# Patient Record
Sex: FEMALE | Race: WHITE | URBAN - METROPOLITAN AREA
[De-identification: names, ages, dates, MRNs, and addresses within clinical notes are randomized per-mention and may not be internally consistent; named-entity substitution may affect disease eponyms.]

---

## 2018-06-01 ENCOUNTER — DOCTOR'S OFFICE (OUTPATIENT)
Dept: URBAN - METROPOLITAN AREA CLINIC 136 | Facility: CLINIC | Age: 38
Setting detail: OPHTHALMOLOGY
End: 2018-06-01
Payer: COMMERCIAL

## 2018-06-01 DIAGNOSIS — H16.222: ICD-10-CM

## 2018-06-01 DIAGNOSIS — H16.221: ICD-10-CM

## 2018-06-01 DIAGNOSIS — H02.89: ICD-10-CM

## 2018-06-01 PROBLEM — H10.11 ALLERGIC CONJUNCTIVITS; RIGHT EYE, LEFT EYE: Status: ACTIVE | Noted: 2018-06-01

## 2018-06-01 PROBLEM — H10.12 ALLERGIC CONJUNCTIVITS; RIGHT EYE, LEFT EYE: Status: ACTIVE | Noted: 2018-06-01

## 2018-06-01 PROCEDURE — 92004 COMPRE OPH EXAM NEW PT 1/>: CPT | Performed by: OPHTHALMOLOGY

## 2018-06-01 ASSESSMENT — REFRACTION_CURRENTRX
OD_OVR_VA: 20/
OS_OVR_VA: 20/
OS_OVR_VA: 20/
OD_OVR_VA: 20/
OS_OVR_VA: 20/
OD_OVR_VA: 20/

## 2018-06-01 ASSESSMENT — TEAR BREAK UP TIME (TBUT)
OD_TBUT: 1+
OS_TBUT: 1+

## 2018-06-01 ASSESSMENT — REFRACTION_AUTOREFRACTION
OD_CYLINDER: -1.00
OD_AXIS: 171
OD_SPHERE: +0.50
OS_AXIS: 152
OS_SPHERE: PLANO
OS_CYLINDER: -0.25

## 2018-06-01 ASSESSMENT — CONFRONTATIONAL VISUAL FIELD TEST (CVF)
OS_FINDINGS: FULL
OD_FINDINGS: FULL

## 2018-06-01 ASSESSMENT — LID EXAM ASSESSMENTS
OS_BLEPHARITIS: ABSENT
OD_MEIBOMITIS: 1+ 2+
OD_COMMENTS: OS
OS_MEIBOMITIS: 1+ 2+
OD_BLEPHARITIS: ABSENT
OS_COMMENTS: OS

## 2018-06-01 ASSESSMENT — REFRACTION_MANIFEST
OS_VA2: 20/
OD_VA2: 20/
OD_VA1: 20/
OU_VA: 20/
OU_VA: 20/
OS_VA3: 20/
OS_VA2: 20/
OU_VA: 20/
OD_VA3: 20/
OS_VA2: 20/
OS_VA3: 20/
OD_VA1: 20/
OS_VA1: 20/
OS_VA1: 20/
OD_VA2: 20/
OS_VA3: 20/
OD_VA2: 20/
OD_VA3: 20/
OD_VA3: 20/
OS_VA1: 20/
OD_VA1: 20/

## 2018-06-01 ASSESSMENT — VISUAL ACUITY
OS_BCVA: 20/20
OD_BCVA: 20/20

## 2018-06-01 ASSESSMENT — SPHEQUIV_DERIVED: OD_SPHEQUIV: 0

## 2021-05-19 ENCOUNTER — OFFICE VISIT (OUTPATIENT)
Dept: FAMILY MEDICINE CLINIC | Facility: CLINIC | Age: 41
End: 2021-05-19
Payer: COMMERCIAL

## 2021-05-19 VITALS
OXYGEN SATURATION: 98 % | SYSTOLIC BLOOD PRESSURE: 102 MMHG | RESPIRATION RATE: 18 BRPM | WEIGHT: 217 LBS | HEIGHT: 63 IN | DIASTOLIC BLOOD PRESSURE: 68 MMHG | BODY MASS INDEX: 38.45 KG/M2 | TEMPERATURE: 97.8 F | HEART RATE: 78 BPM

## 2021-05-19 DIAGNOSIS — E66.9 CLASS 2 OBESITY WITHOUT SERIOUS COMORBIDITY WITH BODY MASS INDEX (BMI) OF 39.0 TO 39.9 IN ADULT, UNSPECIFIED OBESITY TYPE: ICD-10-CM

## 2021-05-19 DIAGNOSIS — Z00.00 ANNUAL PHYSICAL EXAM: ICD-10-CM

## 2021-05-19 DIAGNOSIS — Z76.89 ENCOUNTER TO ESTABLISH CARE: Primary | ICD-10-CM

## 2021-05-19 DIAGNOSIS — Z63.5 DIVORCE: ICD-10-CM

## 2021-05-19 DIAGNOSIS — K21.9 GASTROESOPHAGEAL REFLUX DISEASE, UNSPECIFIED WHETHER ESOPHAGITIS PRESENT: ICD-10-CM

## 2021-05-19 DIAGNOSIS — F41.9 ANXIETY: ICD-10-CM

## 2021-05-19 PROCEDURE — 99386 PREV VISIT NEW AGE 40-64: CPT | Performed by: FAMILY MEDICINE

## 2021-05-19 RX ORDER — FAMOTIDINE 20 MG/1
20 TABLET, FILM COATED ORAL 2 TIMES DAILY
Qty: 60 TABLET | Refills: 1 | Status: SHIPPED | OUTPATIENT
Start: 2021-05-19

## 2021-05-19 RX ORDER — OMEPRAZOLE 20 MG/1
20 CAPSULE, DELAYED RELEASE ORAL DAILY
COMMUNITY

## 2021-05-19 SDOH — SOCIAL STABILITY - SOCIAL INSECURITY: DISRUPTION OF FAMILY BY SEPARATION AND DIVORCE: Z63.5

## 2021-05-19 NOTE — PROGRESS NOTES
Pablo    NAME: Jennifer Pino  AGE: 39 y o  SEX: female  : 1980     DATE: 2021     Assessment and Plan:     Problem List Items Addressed This Visit        Digestive    GERD (gastroesophageal reflux disease)    Relevant Medications    omeprazole (PriLOSEC) 20 mg delayed release capsule    famotidine (PEPCID) 20 mg tablet       Other    Divorce    Relevant Orders    Ambulatory referral to [facility]    Relevant Orders    Ambulatory referral to [address] obesity in adult    Relevant Orders    Comprehensive metabolic panel    Lipid panel    HEMOGLOBIN A1C W/ EAG ESTIMATION      Other Visit Diagnoses     Encounter to establish care    -  Primary    Annual physical exam        Relevant Orders    Mammo screening bilateral w 3d & cad          Immunizations and preventive care screenings were discussed with patient today  Appropriate education was printed on patient's after visit summary  Counseling:  Dental Health: discussed importance of regular tooth brushing, flossing, and dental visits  Injury prevention: discussed safety/seat belts, safety helmets, smoke detectors, carbon dioxide detectors, and smoking near bedding or upholstery  Sexual health: discussed sexually transmitted diseases, partner selection, use of condoms, avoidance of unintended pregnancy, and contraceptive alternatives  · Exercise: the importance of regular exercise/physical activity was discussed  Recommend exercise 3-5 times per week for at least 30 minutes  BMI Counseling: Body mass index is 39 06 kg/m²  The BMI is above normal  Nutrition recommendations include encouraging healthy choices of fruits and vegetables, increasing intake of lean protein and reducing intake of saturated and trans fat   Exercise recommendations include moderate physical activity 150 minutes/week, exercising 3-5 times per week and strength training exercises  Return in about 1 week (around 5/26/2021) for Next scheduled follow up  Patient needs annual gynecologic exam,   Patient also reports right-sided discomfort which may represent sciatic pain to be evaluated at future visit  Chief Complaint:     Chief Complaint   Patient presents with   [facility] Establish Care     establish care ,needs labs done ,needs gastro referral ,no new food or drug alllergies  History of Present Illness:     Adult Annual Physical   Patient here for a comprehensive physical exam  The patient reports  Patient reports GERD is well controlled  She is concerned about her mental health and would like to establish care with a behavioral health professional  She has tried medication for anxiety in the past (Atarax)  She is not interested in pharmacotherapy at this time  She reports bad reaction to medications that make her sleepy  She is interested in getting GED  Patient reports chest discomfort when she is anxious  Patient reports neglecting health needs for last year due to her life stresses  Diet and Physical Activity  · Diet/Nutrition: well balanced diet, consuming 3-5 servings of fruits/vegetables daily, adequate fiber intake and Halal diet  Good lean protein, with limited red meat  · Exercise: no formal exercise  Feeling down because she is not seeing her friends regularly  She is feeling socially anxious since the end of her marriage  There are still legal matters up in the air at present  Depression Screening  PHQ-9 Depression Screening    PHQ-9:   Frequency of the following problems over the past two weeks:      Little interest or pleasure in doing things: 0 - not at all  Feeling down, depressed, or hopeless: 2 - more than half the days  PHQ-2 Score: 2       General Health  · Sleep: sleeps poorly, gets 4-6 hours of sleep on average and reports previous sleep apnea evaluation which was negative     · Hearing: normal - bilateral   · Vision: no vision problems  · Dental: regular dental visits, brushes teeth twice daily, flosses teeth occasionally and has bridges/caps on teeth  /GYN Health  · Patient is: premenopausal  · Last menstrual period: this week  · Contraceptive method: IUD placement  Review of Systems:     Review of Systems   Constitutional: Positive for appetite change (decreased over last 2 weeks)  Negative for activity change and fatigue  HENT: Negative  Eyes: Negative  Respiratory: Positive for shortness of breath (associated with anxiety at bedtime)  Cardiovascular: Positive for leg swelling  Gastrointestinal: Negative  Endocrine: Positive for heat intolerance  Negative for cold intolerance, polydipsia, polyphagia and polyuria  Genitourinary: Negative  Musculoskeletal: Positive for arthralgias (elbows)  Skin:        Rosacea, chronic   Neurological: Negative  Psychiatric/Behavioral: Positive for decreased concentration and sleep disturbance  Negative for dysphoric mood  The patient is nervous/anxious         Past Medical History:     Past Medical History:   Diagnosis Date    Anemia     Anxiety     GERD (gastroesophageal reflux disease)     Shingles       Past Surgical History:     Past Surgical History:   Procedure Laterality Date    COLONOSCOPY  2006    normal  done for constipation/blood in stool     EGD  2016    for GERD     JOINT REPLACEMENT  2003    right shoulder procedure       Social History:        Social History     Socioeconomic History    Marital status: /Civil Union     Spouse name: None    Number of children: 7    Years of education: None    Highest education level: 6th grade   Occupational History    None   Social Needs    Financial resource strain: Not hard at all   [place] insecurity     Worry: Never true     Inability: Never true   [employer] needs     Medical: Yes     Non-medical: No   Tobacco Use    Smoking status: Never Smoker    Smokeless tobacco: Never Used   Substance and Sexual Activity    Alcohol use: Never     Frequency: Never    Drug use: Never    Sexual activity: Not Currently   Lifestyle    Physical activity     Days per week: 0 days     Minutes per session: 0 min    Stress: Very much   Relationships    Social connections     Talks on phone: More than three times a week     Gets together: More than three times a week     Attends Religion service: More than 4 times per year     Active member of club or organization: No     Attends meetings of clubs or organizations: Never     Relationship status:     Intimate partner violence     Fear of current or ex partner: Patient refused     Emotionally abused: Patient refused     Physically abused: Patient refused     Forced sexual activity: Patient refused   Other Topics Concern    None   Social History Narrative    None      Family History:     Family History   Problem Relation Age of Onset    Cancer Father       Current Medications:     Current Outpatient Medications   Medication Sig Dispense Refill    levonorgestrel (MIRENA) 20 MCG/24HR IUD 1 each by Intrauterine route      omeprazole (PriLOSEC) 20 mg delayed release capsule Take 20 mg by mouth daily      famotidine (PEPCID) 20 mg tablet Take 1 tablet (20 mg total) by mouth 2 (two) times a day 60 tablet 1     No current facility-administered medications for this visit  Allergies: Allergies   Allergen Reactions    Diphenhydramine Hives    Midazolam Other (See Comments)      Physical Exam:     /68 (BP Location: Left arm, Patient Position: Sitting, Cuff Size: Standard)   Pulse 78   Temp 97 8 °F (36 6 °C) (Tympanic)   Resp 18   Ht 5' 2 5" (1 588 m)   Wt 98 4 kg (217 lb)   SpO2 98%   BMI 39 06 kg/m²     Physical Exam  Vitals signs reviewed  Constitutional:       Appearance: Normal appearance  She is obese  She is not ill-appearing  HENT:      Head: Normocephalic and atraumatic        Right Ear: Tympanic membrane, ear canal and external ear normal       Left Ear: Tympanic membrane, ear canal and external ear normal       Nose: Nose normal       Mouth/Throat:      Mouth: Mucous membranes are dry  Pharynx: Oropharynx is clear  Eyes:      Extraocular Movements: Extraocular movements intact  Pupils: Pupils are equal, round, and reactive to light  Neck:      Musculoskeletal: Normal range of motion and neck supple  Cardiovascular:      Rate and Rhythm: Normal rate and regular rhythm  Pulses: Normal pulses  Heart sounds: Normal heart sounds  No murmur  Comments: Varicose vein of right lower extremity  Pulmonary:      Effort: Pulmonary effort is normal       Breath sounds: Normal breath sounds  No wheezing or rales  Abdominal:      General: Abdomen is flat  Bowel sounds are normal       Palpations: Abdomen is soft  Tenderness: There is no abdominal tenderness  There is no right CVA tenderness, left CVA tenderness or guarding  Genitourinary:     Comments: Patient deferred breast and gynecologic exam for gynecologic visit  Musculoskeletal:         General: No tenderness  Right lower leg: No edema  Left lower leg: No edema  Skin:     General: Skin is warm and dry  Neurological:      General: No focal deficit present  Mental Status: She is alert  Mental status is at baseline     Psychiatric:      Comments: Anxious          Benja Levine DO  [address] [place] [address]

## 2021-05-19 NOTE — PATIENT INSTRUCTIONS
[address]  [address]  [address]  [phone]    [facility]  [address]   [address] [address]  [phone]  Crisis Line: [phone] (Or call 911)    [facility]  [address]  [phone]  [address]  [phone] in [address]  [place] [address]  [phone]    [address]  [place], [facility]  [phone]

## 2021-06-09 ENCOUNTER — TRANSCRIBE ORDERS (OUTPATIENT)
Dept: LAB | Facility: CLINIC | Age: 41
End: 2021-06-09

## 2021-06-09 ENCOUNTER — APPOINTMENT (OUTPATIENT)
Dept: LAB | Facility: CLINIC | Age: 41
End: 2021-06-09
Payer: COMMERCIAL

## 2021-06-09 DIAGNOSIS — E66.9 SIMPLE OBESITY: Primary | ICD-10-CM

## 2021-06-09 LAB
ALBUMIN SERPL BCP-MCNC: 3.8 G/DL (ref 3.5–5)
ALP SERPL-CCNC: 81 U/L (ref 46–116)
ALT SERPL W P-5'-P-CCNC: 18 U/L (ref 12–78)
ANION GAP SERPL CALCULATED.3IONS-SCNC: 6 MMOL/L (ref 4–13)
AST SERPL W P-5'-P-CCNC: 10 U/L (ref 5–45)
BILIRUB SERPL-MCNC: 0.48 MG/DL (ref 0.2–1)
BUN SERPL-MCNC: 9 MG/DL (ref 5–25)
CALCIUM SERPL-MCNC: 9.1 MG/DL (ref 8.3–10.1)
CHLORIDE SERPL-SCNC: 107 MMOL/L (ref 100–108)
CHOLEST SERPL-MCNC: 160 MG/DL (ref 50–200)
CO2 SERPL-SCNC: 26 MMOL/L (ref 21–32)
CREAT SERPL-MCNC: 0.51 MG/DL (ref 0.6–1.3)
EST. AVERAGE GLUCOSE BLD GHB EST-MCNC: 103 MG/DL
GFR SERPL CREATININE-BSD FRML MDRD: 120 ML/MIN/1.73SQ M
GLUCOSE P FAST SERPL-MCNC: 111 MG/DL (ref 65–99)
HBA1C MFR BLD: 5.2 %
HDLC SERPL-MCNC: 40 MG/DL
LDLC SERPL CALC-MCNC: 106 MG/DL (ref 0–100)
NONHDLC SERPL-MCNC: 120 MG/DL
POTASSIUM SERPL-SCNC: 4 MMOL/L (ref 3.5–5.3)
PROT SERPL-MCNC: 7.4 G/DL (ref 6.4–8.2)
SODIUM SERPL-SCNC: 139 MMOL/L (ref 136–145)
TRIGL SERPL-MCNC: 72 MG/DL

## 2021-06-09 PROCEDURE — 36415 COLL VENOUS BLD VENIPUNCTURE: CPT

## 2021-06-09 PROCEDURE — 83036 HEMOGLOBIN GLYCOSYLATED A1C: CPT

## 2021-06-09 PROCEDURE — 80061 LIPID PANEL: CPT

## 2021-06-09 PROCEDURE — 80053 COMPREHEN METABOLIC PANEL: CPT

## 2021-08-04 ENCOUNTER — PATIENT OUTREACH (OUTPATIENT)
Dept: FAMILY MEDICINE CLINIC | Facility: CLINIC | Age: 41
End: 2021-08-04

## 2021-08-04 NOTE — PROGRESS NOTES
CHERRY HIGGINBOTHAM received referral from provider Dr Greg Garrido to assist patient with becoming connected with OP MH resources  CHERRY HIGGINBOTHAM called patient ([phone]) and introduced CHERRY HIGGINBOTHAM role  CHERRY HIGGINBOTHAM confirmed patient's address, contact information, emergency contacts and confirmed patient's insurance  CHERRY HIGGINBOTHAM completed an assessment with patient  Patient is currently going through a divorce  Patient does not have any income  However, patient's eldest (20 year old) daughter is a PA who has been caring financially for patient  Patient receives about $700 a month in food stamps  Patient lives with her eldest daughter and her 23year old daughter who is in college, her 12year old son, and 6year old twin daughters  Patient receives child support from her ex  for her children  Patient confirmed she always has enough food in her home and is able to pay her bills  Patient is independent with all ADLs and has a car to drive herself and children as needed  Patient considers her children her supports  Patient denies any hx of substance abuse or previous MH hx  Patient reports no barriers to obtain medication  Patient stated that she has been experiencing anxiety, depression and has been crying often due to divorce  Patient stated she  from her  about a year ago and would like to see a therapist as it has been very difficult for her  CHERRY HIGGINBOTHAM provided psychosocial support  However, patient stated she already scheduled an appointment with Saba Ibrahim LCSW on 8/17/21  Patient denies any HI, SI, plan to harm herself or others  CHERRY HIGGINBOTHAM provided patient with [facility] [address] phone number and CHERRY CM contact information  Patient had no other questions concerns or needs  CHERRY HIGGINBOTHAM encouraged patient to contact CHERRY HIGGINBOTHAM for future needs  Please re consult CHERRY HIGGINBOTHAM as needed

## 2021-08-17 ENCOUNTER — SOCIAL WORK (OUTPATIENT)
Dept: BEHAVIORAL/MENTAL HEALTH CLINIC | Facility: CLINIC | Age: 41
End: 2021-08-17
Payer: COMMERCIAL

## 2021-08-17 DIAGNOSIS — F43.22 ADJUSTMENT DISORDER WITH ANXIETY: Primary | ICD-10-CM

## 2021-08-17 PROCEDURE — 90834 PSYTX W PT 45 MINUTES: CPT | Performed by: SOCIAL WORKER

## 2021-08-17 NOTE — PSYCH
This note was not shared with the patient due to this is a psychotherapy note    Assessment/Plan:     F43 22 Adjustment disorder with anxiety  Subjective:    Patient ID: Kayden Hess is a 39 y o  female who presented for an initial outpatient individual counseling session following her most recent office visit with her primary care physician on May 19, 2021 to establish care  A review Jennifer's medical record revealed that her GERD is well controlled  She is concerned about her mental health and would like to establish care with a behavioral health professional    She has tried medication for anxiety in the past (ATARAX)  She is not interested in pharmacotherapy at this time  She reports bad reaction to medication that made her sleepy  She reports chest discomfort when she is anxious  The patient reports neglecting her health needs for the past year due to life stress  For today's session, the undersigned therapist provided Cody Lloyd with an orientation to [facility] by summarizing the counseling process, counseling experience and philosophy of treatment  Discussed hospital policies and procedures and paid special attention to reviewing the limitations of confidentiality and emergency services  Began the process of establishing rapport and gaining a thorough understanding of the presenting problem by completing a comprehensive psychosocial assessment  Jennifer denies depression but complains of social anxiety since  when her older brother   She was depressed then for about 2 years  Jennifer participated in outpatient counseling at [address] [place] [address] several years ago, but stopped going after 2 sessions due to her busy schedule  She's been  from her  since 2020  He's refusing to divorce and just got remarried in [facility]  Cody Prema has 7 children  She got a restraining order against her  in 2021     Jennifer had an arranged marriage to her  in [place] when she was 15years old  She is self-taught in [place]  Jennifer feels broken  She has been living in the [place] since 2002  Alonso Guardado is struggling with guilt about leaving her   The undersigned therapist recommended that Jennifer participate in outpatient counseling services once per week  HPI:  The undersigned therapist provided Jennifer with 45 minutes of psychotherapy  Review of Systems      Objective:  Jennifer presented for today's session as her stated age  She was oriented x3 and maintained good eye contact throughout the session  Jennifer was dressed in traditional Adventist clothes and headdress  She appeared to have adequate hygiene and was well-groomed  Jennifer demonstrated the ability to maintain adequate levels of focus and concentration throughout the session  Her speech was a normal rate and was clear and concise  Jennifer's short- and long-term memory was intact  There was no evidence of a thought disorder or psychosis  She denied suicidal ideation, gesture or plan  Jennifer presented with a cooperative attitude and was easily engaged in the therapeutic process  Her mood was depressed with an affect that was congruent to topic  She appeared internally motivated to participate in the therapeutic process  Jennifer's insight and judgement appeared poor       Physical Exam

## 2021-08-24 ENCOUNTER — SOCIAL WORK (OUTPATIENT)
Dept: BEHAVIORAL/MENTAL HEALTH CLINIC | Facility: CLINIC | Age: 41
End: 2021-08-24
Payer: COMMERCIAL

## 2021-08-24 DIAGNOSIS — F43.22 ADJUSTMENT DISORDER WITH ANXIETY: Primary | ICD-10-CM

## 2021-08-24 PROCEDURE — 90832 PSYTX W PT 30 MINUTES: CPT | Performed by: SOCIAL WORKER

## 2021-08-24 NOTE — PSYCH
This note was not shared with the patient due to this is a psychotherapy note    Assessment/Plan:     F43 22 Adjustment disorder with anxiety  Subjective:    Patient ID: Jose Milligan is a 39 y o  female who presented for a follow-up outpatient individual counseling session after she was late for today's session by approximately 30 minutes  The undersigned therapist assisted Jennifer develop effective coping skills secondary to separation from her   The undersigned therapist provided Jennifer with psychoeducation regarding the [place] Syndrome  HPI:  The undersigned therapist provided Jennifer with 45 minutes of psychotherapy  Review of Systems      Objective:  Jennifer presented for today's session with a cooperative attitude and was easily engaged in the therapeutic process  Her mood was calm and pleasant with an affect that was congruent to topic  There was no evidence of a thought disorder or psychosis  She denied suicidal ideation, gesture or plan  Jennifer felt anxious throughout the week       Physical Exam